# Patient Record
Sex: MALE | Race: WHITE | NOT HISPANIC OR LATINO | Employment: PART TIME | URBAN - METROPOLITAN AREA
[De-identification: names, ages, dates, MRNs, and addresses within clinical notes are randomized per-mention and may not be internally consistent; named-entity substitution may affect disease eponyms.]

---

## 2020-04-22 ENCOUNTER — OFFICE VISIT (OUTPATIENT)
Dept: URGENT CARE | Facility: CLINIC | Age: 55
End: 2020-04-22

## 2020-04-22 VITALS
RESPIRATION RATE: 19 BRPM | HEART RATE: 79 BPM | OXYGEN SATURATION: 98 % | WEIGHT: 220 LBS | BODY MASS INDEX: 31.5 KG/M2 | HEIGHT: 70 IN | DIASTOLIC BLOOD PRESSURE: 92 MMHG | SYSTOLIC BLOOD PRESSURE: 135 MMHG | TEMPERATURE: 98 F

## 2020-04-22 DIAGNOSIS — S39.92XA INJURY OF LOW BACK, INITIAL ENCOUNTER: ICD-10-CM

## 2020-04-22 DIAGNOSIS — S00.83XA HEMATOMA OF OCCIPITAL SURFACE OF HEAD, INITIAL ENCOUNTER: ICD-10-CM

## 2020-04-22 DIAGNOSIS — M53.3 COCCYX PAIN: Primary | ICD-10-CM

## 2020-04-22 PROCEDURE — 99203 OFFICE O/P NEW LOW 30 MIN: CPT | Mod: TIER,S$GLB,, | Performed by: NURSE PRACTITIONER

## 2020-04-22 PROCEDURE — 72220 X-RAY EXAM SACRUM TAILBONE: CPT | Mod: TIER,S$GLB,, | Performed by: INTERNAL MEDICINE

## 2020-04-22 PROCEDURE — 99203 PR OFFICE/OUTPT VISIT, NEW, LEVL III, 30-44 MIN: ICD-10-PCS | Mod: TIER,S$GLB,, | Performed by: NURSE PRACTITIONER

## 2020-04-22 PROCEDURE — 72220 XR SACRUM AND COCCYX: ICD-10-PCS | Mod: TIER,S$GLB,, | Performed by: INTERNAL MEDICINE

## 2020-04-22 RX ORDER — CYCLOBENZAPRINE HCL 10 MG
10 TABLET ORAL 3 TIMES DAILY PRN
Qty: 21 TABLET | Refills: 0 | Status: SHIPPED | OUTPATIENT
Start: 2020-04-22 | End: 2020-04-29

## 2020-04-22 NOTE — PROGRESS NOTES
"Subjective:       Patient ID: Oswald OLMOS Cousin is a 55 y.o. male.    Vitals:  height is 5' 10" (1.778 m) and weight is 99.8 kg (220 lb). His temperature is 97.7 °F (36.5 °C). His blood pressure is 135/92 (abnormal) and his pulse is 79. His respiration is 19 and oxygen saturation is 98%.     Chief Complaint: Fall    Pt states yesterday around 5:00 pm he fell off of a truck. Pt states the truck was not moving. Pt states she fell backward on to his right elbow. He then states he also hit the back of his head. Pt states his biggest concern today is making sure his back and his head is ok.     Fall   The accident occurred 12 to 24 hours ago. Fall occurred: From a truck  He fell from a height of 3 to 5 ft. He landed on concrete. The point of impact was the head, right elbow and right knee. The pain is present in the head, back and right elbow. The pain is at a severity of 7/10. The pain is moderate. Pertinent negatives include no abdominal pain, hematuria or loss of consciousness. He has tried acetaminophen for the symptoms. The treatment provided no relief.       Constitution: Negative for fatigue.   HENT: Negative for facial swelling and facial trauma.    Neck: Negative for neck stiffness.   Cardiovascular: Negative for chest trauma.   Eyes: Negative for eye trauma, double vision and blurred vision.   Gastrointestinal: Negative for abdominal trauma, abdominal pain and rectal bleeding.   Genitourinary: Negative for hematuria, genital trauma and pelvic pain.   Musculoskeletal: Positive for pain, trauma and back pain. Negative for joint swelling, abnormal ROM of joint and pain with walking.   Skin: Negative for color change, wound, abrasion and laceration.   Neurological: Negative for dizziness, history of vertigo, light-headedness, coordination disturbances, altered mental status and loss of consciousness.   Hematologic/Lymphatic: Negative for history of bleeding disorder.   Psychiatric/Behavioral: Negative for " altered mental status.       Objective:      Physical Exam   Constitutional: He is oriented to person, place, and time. He appears well-developed and well-nourished. He is cooperative.  Non-toxic appearance. He does not appear ill. No distress.   HENT:   Head: Normocephalic and atraumatic.       Right Ear: Hearing, tympanic membrane, external ear and ear canal normal.   Left Ear: Hearing, tympanic membrane, external ear and ear canal normal.   Nose: Nose normal. No mucosal edema, rhinorrhea or nasal deformity. No epistaxis. Right sinus exhibits no maxillary sinus tenderness and no frontal sinus tenderness. Left sinus exhibits no maxillary sinus tenderness and no frontal sinus tenderness.   Mouth/Throat: Uvula is midline, oropharynx is clear and moist and mucous membranes are normal. No trismus in the jaw. Normal dentition. No uvula swelling. No posterior oropharyngeal erythema.   Hematoma noted   Eyes: Pupils are equal, round, and reactive to light. Conjunctivae, EOM and lids are normal. Right eye exhibits no discharge. Left eye exhibits no discharge. No scleral icterus.   Neck: Trachea normal, normal range of motion, full passive range of motion without pain and phonation normal. Neck supple.   Cardiovascular: Normal rate, regular rhythm, normal heart sounds, intact distal pulses and normal pulses.   Pulmonary/Chest: Effort normal and breath sounds normal. No respiratory distress.   Abdominal: Soft. Normal appearance and bowel sounds are normal. He exhibits no distension, no pulsatile midline mass and no mass. There is no tenderness.   Musculoskeletal: He exhibits no edema or deformity.        Lumbar back: He exhibits decreased range of motion, tenderness and pain.   positive straight leg raise   Full ROM BLE with 5/5 strength   NVIT distally with sensation intact to light touch  Ambulates with a normal steady gait   Pain worsens with sitting and leaning forward.    Neurological: He is alert and oriented to person,  place, and time. He has normal reflexes. He exhibits normal muscle tone. Coordination normal.   Neurologically intact   Skin: Skin is warm, dry, intact, not diaphoretic and not pale.   Psychiatric: He has a normal mood and affect. His speech is normal and behavior is normal. Judgment and thought content normal. Cognition and memory are normal.   Nursing note and vitals reviewed.      Xr Sacrum And Coccyx    Result Date: 4/22/2020  EXAMINATION: XR SACRUM AND COCCYX CLINICAL HISTORY: Sacrococcygeal disorders, not elsewhere classified TECHNIQUE: Two or three views of the sacrum and coccyx were performed. COMPARISON: None FINDINGS: There is mild degenerative change in the lower lumbar spine.  Bowel content partly obscures the sacrum on AP views however there is no definite osseous abnormality noted.  A few vascular calcifications are present in the pelvis.     No definite osseous abnormality Electronically signed by: Heike De MD Date:    04/22/2020 Time:    09:56  Assessment:       1. Coccyx pain    2. Injury of low back, initial encounter    3. Hematoma of occipital surface of head, initial encounter        Plan:       Patient had of hearing. denies LOC and being on blood thinners. Answering questions appropriately. Neurovascularly intact. Educated patient that he most likely had a concussion for to watch for worsening of symptoms. If symptoms worsen patient to go to emergency room immediately. States understanding.   Coccyx pain  -     XR SACRUM AND COCCYX; Future; Expected date: 04/22/2020    Injury of low back, initial encounter  -     [facility]ulatory referral[facility]ult [facility]  -     cyclobenzaprine (FLEXERIL) 10 MG tablet; Take 1 tablet (10 mg total) by mouth 3 (three) times daily as needed for Muscle spasms.  Dispense: 21 tablet; Refill: 0    Hematoma of occipital surface of head, initial encounter      Patient Instructions   Take tylenol for minor pain. Refrain from NSAIDS for 24 hours after  injury.     Patient to take flexeril as directed for muscle tension and back spasm.     Flexeril is a muscle relaxer and will make drowsy do not operate heavy machinery when taking.     Apply ice to hematoma to help with swelling.     If you develop increase in headache, nausea, vomiting, or blurry vision patient to go to the emergency room immediately for further evaluation.     You must understand that you've received an Urgent Care treatment only and that you may be released before all your medical problems are known or treated. You, the patient, will arrange for follow up care as instructed.  Follow up with your PCP or specialty clinic as directed in the next 1-2 weeks if not improved or as needed.  You can call [phone] to schedule an appointment with the appropriate provider.  If your condition worsens we recommend that you receive another evaluation at the emergency room immediately or contact your primary medical clinics after hours call service to discuss your concerns.  Please return here or go to the Emergency Department for any concerns or worsening of condition.      Head Injury (Adult)    You have a head injury. It does not appear serious at this time. But symptoms of a more serious problem, such as a mild brain injury (concussion) or bruising or bleeding in the brain, may appear later. For this reason, you or someone caring for you will need to watch for the symptoms listed below. Once youre home, also be sure to follow any care instructions youre given.  Home care  Watch for the following symptoms  Seek emergency medical care if you have any of these symptoms over the next hours to days:   · Headache  · Nausea or vomiting  · Dizziness  · Sensitivity to light or noise  · Unusual sleepiness or grogginess  · Trouble falling asleep  · Personality changes  · Vision changes  · Memory loss  · Confusion  · Trouble walking or clumsiness  · Loss of consciousness (even for a short time)  · Inability to  be awakened  · Stiff neck  · Weakness or numbness in any part of the body  · Seizures  General care  · If you were prescribed medicines for pain, use them as directed. Note: Dont take other medicines for pain without talking to your provider first.  · To help reduce swelling and pain, apply a cold source to the injured area for up to 20 minutes at a time. Do this as often as directed. Use a cold pack or bag of ice wrapped in a thin towel. Never apply a cold source directly to the skin.  · If you have cuts or scrapes as a result of your head injury, care for them as directed.  · For the next 24 hours (or longer, if instructed):  ¨ Dont drink alcohol or use sedatives or other medicines that make you sleepy.  ¨ Dont drive or operate machinery.  ¨ Dont do anything strenuous, such as heavy lifting or straining.  ¨ Limit tasks that require concentration. This includes reading, using a smartphone or computer, watching TV, and playing video games.  ¨ Dont return to sports or other activities that could result in another head injury.  Follow-up care  Follow up with your healthcare provider, or as directed. If imaging tests were done, they will be reviewed by a doctor. You will be told the results and any new findings that may affect your care.  When to seek medical advice  Call your healthcare provider right away if any of these occur:  · Pain doesnt get better or worsens  · New or increased swelling or bruising  · Fever of 100.4°F (38°C) or higher, or as directed by your provider  · Increased redness, warmth, drainage, or bleeding from the injured area  · Fluid drainage or bleeding from the nose or ears  · Any depression or bony abnormality in the injured area  Date Last Reviewed: 9/26/2015  © 8543-1163 [employer]. [address] [place] [address], [place] [address]. All rights reserved. This information is not intended as a substitute for professional medical care. Always follow your healthcare professional's  instructions.          Coccyx or Sacrum Contusion    You have a contusion (bruise) of the coccyx or sacrum. The sacrum is the triangular bone at the base of the spine that joins the pelvic bones. The coccyx (tailbone) is the last bone of the sacrum that hangs down in a point like a small tail. Symptoms include swelling and some bleeding under the skin. This injury generaly takes a few weeks to heal. During that time, the bruise will typically change in color from reddish, to purple-blue, to greenish-yellow, then to yellow-brown. A crack (fracture) in the coccyx bone causes the same symptoms as a contusion in this area. Often, X-rays are not taken since the treatment is the same. If you have fracture of the tailbone as well as a contusion, healing generally takes about 4 weeks or longer.  Home care  · Try to find a position of comfort. Try lying on your side with your knees bent up towards your chest and a pillow between your knees.  · A bruised tailbone causes pain when sitting. You may try using a donut pillow. This is a foam pillow with a hole in the center to prevent pressure on the tailbone. You can buy this at a pharmacy or orthopedic supply store.  · Ice the injured area to help reduce pain and swelling. Wrap a cold source (ice pack or ice cubes in a plastic bag) in a thin towel. Apply to the bruised area for 20 minutes every 1 to 2 hours the first day. Continue this 3 to 4 times a day until the pain and swelling goes away.  · Unless another medicine was prescribed, you can take acetaminophen, ibuprofen, or naproxen to control pain. (If you have chronic liver or kidney disease or ever had a stomach ulcer or gastrointestinal bleeding, talk with your doctor before using these medicines.)  Follow-up care  Follow up with your healthcare provider, or as advised. Call if you are not improving within 2 weeks.  When to seek medical advice   Call your healthcare provider right away if you have any of the  following:  · Increased pain or swelling  · Pain that becomes worse or spreads to one or both legs  · Weakness or numbness in one or both legs  · Loss of bowel or bladder control  · Numbness in the groin area  · Signs of infection, including warmth, drainage, or increased redness  · Frequent bruising for unknown reasons  Date Last Reviewed: 2/1/2017  © 7195-9578 [employer]. [address] [place] [address], [place] [address]. All rights reserved. This information is not intended as a substitute for professional medical care. Always follow your healthcare professional's instructions.

## 2020-04-22 NOTE — LETTER
April 22, 2020      [facility] [address] [place] [address]  Phone: [phone]  Fax: [phone]       Patient: Oswald Yarbrough   YOB: 1965  Date of Visit: 04/22/2020    To Whom It May Concern:    Jessika Yarbrough  was at [facility] on 04/22/2020. He may return to work/school on 4/27/2020 with no restrictions. If you have any questions or concerns, or if I can be of further assistance, please do not hesitate to contact me.    Sincerely,    Lissette Bower NP

## 2020-04-22 NOTE — PATIENT INSTRUCTIONS
Take tylenol for minor pain. Refrain from NSAIDS for 24 hours after injury.     Patient to take flexeril as directed for muscle tension and back spasm.     Flexeril is a muscle relaxer and will make drowsy do not operate heavy machinery when taking.     Apply ice to hematoma to help with swelling.     If you develop increase in headache, nausea, vomiting, or blurry vision patient to go to the emergency room immediately for further evaluation.     You must understand that you've received an Urgent Care treatment only and that you may be released before all your medical problems are known or treated. You, the patient, will arrange for follow up care as instructed.  Follow up with your PCP or specialty clinic as directed in the next 1-2 weeks if not improved or as needed.  You can call [phone] to schedule an appointment with the appropriate provider.  If your condition worsens we recommend that you receive another evaluation at the emergency room immediately or contact your primary medical clinics after hours call service to discuss your concerns.  Please return here or go to the Emergency Department for any concerns or worsening of condition.      Head Injury (Adult)    You have a head injury. It does not appear serious at this time. But symptoms of a more serious problem, such as a mild brain injury (concussion) or bruising or bleeding in the brain, may appear later. For this reason, you or someone caring for you will need to watch for the symptoms listed below. Once youre home, also be sure to follow any care instructions youre given.  Home care  Watch for the following symptoms  Seek emergency medical care if you have any of these symptoms over the next hours to days:   · Headache  · Nausea or vomiting  · Dizziness  · Sensitivity to light or noise  · Unusual sleepiness or grogginess  · Trouble falling asleep  · Personality changes  · Vision changes  · Memory loss  · Confusion  · Trouble walking or  clumsiness  · Loss of consciousness (even for a short time)  · Inability to be awakened  · Stiff neck  · Weakness or numbness in any part of the body  · Seizures  General care  · If you were prescribed medicines for pain, use them as directed. Note: Dont take other medicines for pain without talking to your provider first.  · To help reduce swelling and pain, apply a cold source to the injured area for up to 20 minutes at a time. Do this as often as directed. Use a cold pack or bag of ice wrapped in a thin towel. Never apply a cold source directly to the skin.  · If you have cuts or scrapes as a result of your head injury, care for them as directed.  · For the next 24 hours (or longer, if instructed):  ¨ Dont drink alcohol or use sedatives or other medicines that make you sleepy.  ¨ Dont drive or operate machinery.  ¨ Dont do anything strenuous, such as heavy lifting or straining.  ¨ Limit tasks that require concentration. This includes reading, using a smartphone or computer, watching TV, and playing video games.  ¨ Dont return to sports or other activities that could result in another head injury.  Follow-up care  Follow up with your healthcare provider, or as directed. If imaging tests were done, they will be reviewed by a doctor. You will be told the results and any new findings that may affect your care.  When to seek medical advice  Call your healthcare provider right away if any of these occur:  · Pain doesnt get better or worsens  · New or increased swelling or bruising  · Fever of 100.4°F (38°C) or higher, or as directed by your provider  · Increased redness, warmth, drainage, or bleeding from the injured area  · Fluid drainage or bleeding from the nose or ears  · Any depression or bony abnormality in the injured area  Date Last Reviewed: 9/26/2015  © 6077-3460 [employer]. [address] [place] [address], [place] [address]. All rights reserved. This information is not intended as a substitute  for professional medical care. Always follow your healthcare professional's instructions.          Coccyx or Sacrum Contusion    You have a contusion (bruise) of the coccyx or sacrum. The sacrum is the triangular bone at the base of the spine that joins the pelvic bones. The coccyx (tailbone) is the last bone of the sacrum that hangs down in a point like a small tail. Symptoms include swelling and some bleeding under the skin. This injury generaly takes a few weeks to heal. During that time, the bruise will typically change in color from reddish, to purple-blue, to greenish-yellow, then to yellow-brown. A crack (fracture) in the coccyx bone causes the same symptoms as a contusion in this area. Often, X-rays are not taken since the treatment is the same. If you have fracture of the tailbone as well as a contusion, healing generally takes about 4 weeks or longer.  Home care  · Try to find a position of comfort. Try lying on your side with your knees bent up towards your chest and a pillow between your knees.  · A bruised tailbone causes pain when sitting. You may try using a donut pillow. This is a foam pillow with a hole in the center to prevent pressure on the tailbone. You can buy this at a pharmacy or orthopedic supply store.  · Ice the injured area to help reduce pain and swelling. Wrap a cold source (ice pack or ice cubes in a plastic bag) in a thin towel. Apply to the bruised area for 20 minutes every 1 to 2 hours the first day. Continue this 3 to 4 times a day until the pain and swelling goes away.  · Unless another medicine was prescribed, you can take acetaminophen, ibuprofen, or naproxen to control pain. (If you have chronic liver or kidney disease or ever had a stomach ulcer or gastrointestinal bleeding, talk with your doctor before using these medicines.)  Follow-up care  Follow up with your healthcare provider, or as advised. Call if you are not improving within 2 weeks.  When to seek medical  advice   Call your healthcare provider right away if you have any of the following:  · Increased pain or swelling  · Pain that becomes worse or spreads to one or both legs  · Weakness or numbness in one or both legs  · Loss of bowel or bladder control  · Numbness in the groin area  · Signs of infection, including warmth, drainage, or increased redness  · Frequent bruising for unknown reasons  Date Last Reviewed: 2/1/2017  © 3223-9129 [employer]. [address] [place] [address], [place] [id number]. All rights reserved. This information is not intended as a substitute for professional medical care. Always follow your healthcare professional's instructions.